# Patient Record
Sex: FEMALE | Race: WHITE | NOT HISPANIC OR LATINO | URBAN - METROPOLITAN AREA
[De-identification: names, ages, dates, MRNs, and addresses within clinical notes are randomized per-mention and may not be internally consistent; named-entity substitution may affect disease eponyms.]

---

## 2023-01-20 ENCOUNTER — TRANSFERRED RECORDS (OUTPATIENT)
Dept: HEALTH INFORMATION MANAGEMENT | Facility: CLINIC | Age: 38
End: 2023-01-20

## 2023-01-23 ENCOUNTER — TRANSCRIBE ORDERS (OUTPATIENT)
Dept: OTHER | Age: 38
End: 2023-01-23

## 2023-01-23 DIAGNOSIS — E04.1 THYROID NODULE: Primary | ICD-10-CM

## 2023-01-23 NOTE — TELEPHONE ENCOUNTER
FUTURE VISIT INFORMATION      FUTURE VISIT INFORMATION:    Date: 2/27/23    Time: 1:20pm    Location: [facility] [place]  REFERRAL INFORMATION:    Referring provider:  Dedra Knight    Referring providers clinic:  [facility]    Reason for visit/diagnosis  Per Pt, dx throid nodule, referral from Dedra Knight , rec in epic - will bring insurance when she comes to appt, unavailable to obtain during call    RECORDS REQUESTED FROM:       Clinic name Comments Records Status Imaging Status   [facility] Dedra Campbell 1/24/23- pending  Received 1/24/23    [facility] 6/1/21- US Thyroid     OV  6/1/21- note with Schober, Sonya L. DO CE 2/6/23-  Pending - PACS                             January 24, 2023 9:17 AM - Faxed a request to [facility] to send us records- Nancy   1/24/23 2:08pm - received records from [facility] and sent it to Scan - Nancy   February 6, 2023 10:54 AM - Faxed a request to [facility] to push Image to [facility] PACS- Nancy   February 13, 2023 at 9:07 AM - 6/1/21 US images is in PACS -[facility]

## 2023-02-12 ENCOUNTER — HEALTH MAINTENANCE LETTER (OUTPATIENT)
Age: 38
End: 2023-02-12

## 2023-02-24 NOTE — TELEPHONE ENCOUNTER
FUTURE VISIT INFORMATION      FUTURE VISIT INFORMATION:    Date: 4/3/23     Time: 2pm    Location: [facility] [place]  REFERRAL INFORMATION:    Referring provider:  Dedra Knight    Referring providers clinic:  [facility]    Reason for visit/diagnosis  Per Pt, dx throid nodule, referral from Dedra Knight , rec in epic - will bring insurance when she comes to appt, unavailable to obtain during call     RECORDS REQUESTED FROM:         Clinic name Comments Records Status Imaging Status   Dedra Thomas 1/24/23- pending  Received 1/24/23     [facility]      OV  6/1/21- note with Schober, Sonya L. DO CE 2/6/23-  Pending - PACS

## 2023-02-27 ENCOUNTER — PRE VISIT (OUTPATIENT)
Dept: OTOLARYNGOLOGY | Facility: CLINIC | Age: 38
End: 2023-02-27
Payer: COMMERCIAL

## 2023-04-03 ENCOUNTER — PRE VISIT (OUTPATIENT)
Dept: OTOLARYNGOLOGY | Facility: CLINIC | Age: 38
End: 2023-04-03

## 2023-04-03 ENCOUNTER — OFFICE VISIT (OUTPATIENT)
Dept: OTOLARYNGOLOGY | Facility: CLINIC | Age: 38
End: 2023-04-03
Attending: OBSTETRICS & GYNECOLOGY
Payer: COMMERCIAL

## 2023-04-03 VITALS
SYSTOLIC BLOOD PRESSURE: 134 MMHG | HEART RATE: 89 BPM | HEIGHT: 64 IN | OXYGEN SATURATION: 100 % | DIASTOLIC BLOOD PRESSURE: 82 MMHG | TEMPERATURE: 98.3 F | WEIGHT: 196 LBS | BODY MASS INDEX: 33.46 KG/M2

## 2023-04-03 DIAGNOSIS — E04.1 THYROID NODULE: ICD-10-CM

## 2023-04-03 PROCEDURE — 99203 OFFICE O/P NEW LOW 30 MIN: CPT | Performed by: SURGERY

## 2023-04-03 RX ORDER — VALACYCLOVIR HYDROCHLORIDE 500 MG/1
500 TABLET, FILM COATED ORAL 2 TIMES DAILY PRN
COMMUNITY

## 2023-04-03 ASSESSMENT — PAIN SCALES - GENERAL: PAINLEVEL: NO PAIN (0)

## 2023-04-03 NOTE — Clinical Note
4/3/2023       RE: Ksenia Mera  [address]  [facility] [address]     Dear Colleague,    Thank you for referring your patient, Ksenia Mera, to the [facility]. Please see a copy of my visit note below.    No notes on file    Again, thank you for allowing me to participate in the care of your patient.      Sincerely,    Sidra Bright MD

## 2023-04-03 NOTE — NURSING NOTE
"Chief Complaint   Patient presents with     Consult     Thyroid nodule      Blood pressure 134/82, pulse 89, temperature 98.3  F (36.8  C), height 1.626 m (5' 4\"), weight 88.9 kg (196 lb), SpO2 100 %.    El Lyman LPN    "

## 2023-04-03 NOTE — PATIENT INSTRUCTIONS
"You were seen in the clinic today by Dr. Bright. If you have any questions or concerns after your appointment, please call the clinic at [phone]. Press \"1\" for scheduling, press \"3\" for nurse advice.    2.   The following has been recommended for you based upon your appointment today:   -please schedule US thyroid biopsy      Khloe MUJICA, RN  [facility]  Department of Otolaryngology  [phone]   "

## 2023-05-25 NOTE — PROGRESS NOTES
"SURGERY CLINIC CONSULTATION    REASON FOR CONSULTATION:  Ksenia Mera was referred by [facility] team for evaluation and discussion of treatment options for thyroid nodules     HISTORY OF PRESENT ILLNESS:  Ksenia Mera is a 37 year old female who is being evaluated by an OB/GYN team via video visit and commented about a nodule of her thyroid that she has had since 2021.  The nodule was ultrasounded and 2021 noted to be a 2.3 cm TR 3.  This did not meet criteria for biopsy.  A follow-up ultrasound in March 2023 confirmed now a 2.6 cm thyroid nodule TR 3 and a second nodule on the right side as well at 1.0 cm TR 4.  Thyroid function tests have been normal previously and currently.    Regarding the symptoms of the thyroid nodule she does have a globus sensation that has been evaluated by gastroenterology.  She denies any problems with swallowing breathing or voice quality.  No symptoms of hypo or hyperthyroidism.  No family or previous history of papillary thyroid carcinoma.  No previous head neck radiation exposure      REVIEW OF SYSTEMS:  ROS EXAM: 10 point review of systems is pertinent for that noted in the HPI  There is no problem list on file for this patient.      Past Surgical History:   Procedure Laterality Date     ADENOIDECTOMY  ~2008     TONSILLECTOMY  ~2008       No Known Allergies    Medications reviewed in the EMR  Family History   Problem Relation Age of Onset     Cancer Father         prostate     Dementia Maternal Grandmother      Cancer Paternal Grandmother         cll        PHYSICAL EXAM:  /82   Pulse 89   Temp 98.3  F (36.8  C)   Ht 1.626 m (5' 4\")   Wt 88.9 kg (196 lb)   SpO2 100%   BMI 33.64 kg/m      Neck: On inspection there is an obvious nodule in the right lobe of the thyroid.  Palpating the thyroid the superior and inferior borders are palpable.  There is about a 2-1/2 cm well-circumscribed very soft nodule in the right lobe of the thyroid.  I do not feel any other " thyroid nodules.  Trachea is midline.  No cervical lymphadenopathy.    I reviewed the radiographic images and laboratory data    ASSESSMENT:   1. Thyroid nodule        PLAN:   Although upon my review of the ultrasound this nodule really has not changed in size or characteristics, based on the read it does meet criteria for fine-needle aspiration biopsy I have placed an order for such.  The 1 cm nodule does not meet criteria for biopsy.  We will discuss with the patient plans if any after the results of her biopsy    Sidra Bright MD

## 2024-03-10 ENCOUNTER — HEALTH MAINTENANCE LETTER (OUTPATIENT)
Age: 39
End: 2024-03-10

## 2024-03-20 DIAGNOSIS — E04.1 THYROID NODULE: Primary | ICD-10-CM

## 2024-03-26 ENCOUNTER — ANCILLARY PROCEDURE (OUTPATIENT)
Dept: ULTRASOUND IMAGING | Facility: CLINIC | Age: 39
End: 2024-03-26
Attending: SURGERY
Payer: COMMERCIAL

## 2024-03-26 DIAGNOSIS — E04.1 THYROID NODULE: ICD-10-CM

## 2024-03-26 PROCEDURE — 10005 FNA BX W/US GDN 1ST LES: CPT | Performed by: STUDENT IN AN ORGANIZED HEALTH CARE EDUCATION/TRAINING PROGRAM

## 2024-03-26 PROCEDURE — 88173 CYTOPATH EVAL FNA REPORT: CPT | Mod: 26 | Performed by: PATHOLOGY

## 2024-03-26 PROCEDURE — 88172 CYTP DX EVAL FNA 1ST EA SITE: CPT | Mod: 26 | Performed by: PATHOLOGY

## 2024-03-26 PROCEDURE — 88172 CYTP DX EVAL FNA 1ST EA SITE: CPT | Mod: TC | Performed by: SURGERY

## 2024-03-26 RX ORDER — LIDOCAINE HYDROCHLORIDE 10 MG/ML
5 INJECTION, SOLUTION INFILTRATION; PERINEURAL ONCE
Status: COMPLETED | OUTPATIENT
Start: 2024-03-26 | End: 2024-03-26

## 2024-03-26 RX ADMIN — LIDOCAINE HYDROCHLORIDE 3 ML: 10 INJECTION, SOLUTION INFILTRATION; PERINEURAL at 09:25

## 2024-03-26 NOTE — DISCHARGE INSTRUCTIONS
Directions for after your Thyroid Fine Needle Aspiration:  You can remove your bandage within a few hours.  The site of the biopsy may be sore for a day or two after the procedure. You can take over-the-counter pain medicine if needed.  Notify your doctor if you have any of the following:  Fever above 101 degrees  Swelling in the area of the biopsy  Redness or leaking from the biopsy site  Your doctor will notify you of the results in 2-3 days.

## 2024-03-28 LAB
PATH REPORT.COMMENTS IMP SPEC: NORMAL
PATH REPORT.COMMENTS IMP SPEC: NORMAL
PATH REPORT.FINAL DX SPEC: NORMAL
PATH REPORT.GROSS SPEC: NORMAL
PATH REPORT.MICROSCOPIC SPEC OTHER STN: NORMAL
PATH REPORT.RELEVANT HX SPEC: NORMAL

## 2025-04-27 ENCOUNTER — HEALTH MAINTENANCE LETTER (OUTPATIENT)
Age: 40
End: 2025-04-27

## 2025-08-31 ENCOUNTER — HEALTH MAINTENANCE LETTER (OUTPATIENT)
Age: 40
End: 2025-08-31

## (undated) RX ORDER — LIDOCAINE HYDROCHLORIDE 10 MG/ML
INJECTION, SOLUTION EPIDURAL; INFILTRATION; INTRACAUDAL; PERINEURAL
Status: DISPENSED
Start: 2024-03-26